# Patient Record
Sex: MALE | Race: WHITE | NOT HISPANIC OR LATINO | Employment: OTHER | URBAN - METROPOLITAN AREA
[De-identification: names, ages, dates, MRNs, and addresses within clinical notes are randomized per-mention and may not be internally consistent; named-entity substitution may affect disease eponyms.]

---

## 2019-12-13 ENCOUNTER — TRANSCRIBE ORDERS (OUTPATIENT)
Dept: POST ACUTE CARE | Age: 79
End: 2019-12-13

## 2019-12-17 ENCOUNTER — NURSING HOME VISIT (OUTPATIENT)
Dept: POST ACUTE CARE | Age: 79
End: 2019-12-17

## 2019-12-17 DIAGNOSIS — K21.9 GASTROESOPHAGEAL REFLUX DISEASE WITHOUT ESOPHAGITIS: ICD-10-CM

## 2019-12-17 DIAGNOSIS — E55.9 VITAMIN D DEFICIENCY: ICD-10-CM

## 2019-12-17 DIAGNOSIS — R33.9 URINARY RETENTION: ICD-10-CM

## 2019-12-17 DIAGNOSIS — Z96.642 STATUS POST TOTAL REPLACEMENT OF LEFT HIP: Primary | ICD-10-CM

## 2019-12-17 DIAGNOSIS — R53.81 DEBILITY: ICD-10-CM

## 2019-12-17 DIAGNOSIS — Z79.01 ANTICOAGULATED: ICD-10-CM

## 2019-12-17 DIAGNOSIS — M15.9 PRIMARY OSTEOARTHRITIS INVOLVING MULTIPLE JOINTS: ICD-10-CM

## 2019-12-17 PROCEDURE — 99310 SBSQ NF CARE HIGH MDM 45: CPT | Performed by: CLINICAL NURSE SPECIALIST

## 2019-12-17 PROCEDURE — 1125F AMNT PAIN NOTED PAIN PRSNT: CPT | Performed by: CLINICAL NURSE SPECIALIST

## 2019-12-17 PROCEDURE — 1160F RVW MEDS BY RX/DR IN RCRD: CPT | Performed by: CLINICAL NURSE SPECIALIST

## 2019-12-17 PROCEDURE — 1157F ADVNC CARE PLAN IN RCRD: CPT | Performed by: CLINICAL NURSE SPECIALIST

## 2019-12-17 PROCEDURE — 1170F FXNL STATUS ASSESSED: CPT | Performed by: CLINICAL NURSE SPECIALIST

## 2019-12-17 RX ORDER — DOCUSATE SODIUM 100 MG/1
100 CAPSULE, LIQUID FILLED ORAL 2 TIMES DAILY PRN
COMMUNITY

## 2019-12-17 RX ORDER — BISACODYL 10 MG
10 SUPPOSITORY, RECTAL RECTAL DAILY PRN
COMMUNITY

## 2019-12-17 RX ORDER — SALIVA STIMULANT COMB. NO.3
1 SPRAY, NON-AEROSOL (ML) MUCOUS MEMBRANE
COMMUNITY

## 2019-12-17 RX ORDER — TAMSULOSIN HYDROCHLORIDE 0.4 MG/1
0.4 CAPSULE ORAL
COMMUNITY

## 2019-12-17 RX ORDER — ERGOCALCIFEROL 1.25 MG/1
1 CAPSULE ORAL
COMMUNITY

## 2019-12-17 RX ORDER — HYDROCODONE BITARTRATE AND ACETAMINOPHEN 10; 325 MG/1; MG/1
1 TABLET ORAL EVERY 6 HOURS PRN
COMMUNITY
Start: 2019-12-31

## 2019-12-17 RX ORDER — OMEPRAZOLE 20 MG/1
40 CAPSULE, DELAYED RELEASE ORAL DAILY
COMMUNITY

## 2019-12-17 ASSESSMENT — ENCOUNTER SYMPTOMS
WEAKNESS: 1
ACTIVITY CHANGE: 1

## 2019-12-20 ENCOUNTER — NURSING HOME VISIT (OUTPATIENT)
Dept: POST ACUTE CARE | Age: 79
End: 2019-12-20

## 2019-12-20 DIAGNOSIS — M13.89 ALLERGIC ARTHRITIS, MULTIPLE SITES: ICD-10-CM

## 2019-12-20 DIAGNOSIS — Z96.642 STATUS POST TOTAL REPLACEMENT OF LEFT HIP: ICD-10-CM

## 2019-12-20 DIAGNOSIS — R53.81 DEBILITY: Primary | ICD-10-CM

## 2019-12-20 PROCEDURE — 99309 SBSQ NF CARE MODERATE MDM 30: CPT | Performed by: NURSE PRACTITIONER

## 2019-12-20 PROCEDURE — 1160F RVW MEDS BY RX/DR IN RCRD: CPT | Performed by: NURSE PRACTITIONER

## 2019-12-20 PROCEDURE — 1126F AMNT PAIN NOTED NONE PRSNT: CPT | Performed by: NURSE PRACTITIONER

## 2019-12-20 PROCEDURE — 1170F FXNL STATUS ASSESSED: CPT | Performed by: NURSE PRACTITIONER

## 2019-12-23 PROBLEM — M13.89 ALLERGIC ARTHRITIS, MULTIPLE SITES: Status: ACTIVE | Noted: 2019-12-23

## 2019-12-23 ASSESSMENT — ENCOUNTER SYMPTOMS
WEAKNESS: 1
ACTIVITY CHANGE: 1

## 2019-12-24 ENCOUNTER — NURSING HOME VISIT (OUTPATIENT)
Dept: POST ACUTE CARE | Age: 79
End: 2019-12-24

## 2019-12-24 DIAGNOSIS — K21.9 GASTROESOPHAGEAL REFLUX DISEASE WITHOUT ESOPHAGITIS: ICD-10-CM

## 2019-12-24 DIAGNOSIS — Z96.642 STATUS POST TOTAL REPLACEMENT OF LEFT HIP: Primary | ICD-10-CM

## 2019-12-24 DIAGNOSIS — M15.9 PRIMARY OSTEOARTHRITIS INVOLVING MULTIPLE JOINTS: ICD-10-CM

## 2019-12-24 DIAGNOSIS — R53.81 DEBILITY: ICD-10-CM

## 2019-12-24 DIAGNOSIS — M13.89 ALLERGIC ARTHRITIS, MULTIPLE SITES: ICD-10-CM

## 2019-12-24 PROCEDURE — 1170F FXNL STATUS ASSESSED: CPT | Performed by: NURSE PRACTITIONER

## 2019-12-24 PROCEDURE — 1160F RVW MEDS BY RX/DR IN RCRD: CPT | Performed by: NURSE PRACTITIONER

## 2019-12-24 PROCEDURE — 1126F AMNT PAIN NOTED NONE PRSNT: CPT | Performed by: NURSE PRACTITIONER

## 2019-12-24 PROCEDURE — 99316 NF DSCHRG MGMT 30 MIN+: CPT | Performed by: NURSE PRACTITIONER

## 2019-12-26 ENCOUNTER — NURSING HOME VISIT (OUTPATIENT)
Dept: POST ACUTE CARE | Age: 79
End: 2019-12-26

## 2019-12-26 DIAGNOSIS — M15.9 PRIMARY OSTEOARTHRITIS INVOLVING MULTIPLE JOINTS: ICD-10-CM
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
## 2019-12-26 DIAGNOSIS — Z96.642 STATUS POST TOTAL REPLACEMENT OF LEFT HIP: ICD-10-CM

## 2019-12-26 PROCEDURE — 1126F AMNT PAIN NOTED NONE PRSNT: CPT | Performed by: NURSE PRACTITIONER

## 2019-12-26 PROCEDURE — 99309 SBSQ NF CARE MODERATE MDM 30: CPT | Performed by: NURSE PRACTITIONER

## 2019-12-26 PROCEDURE — 1170F FXNL STATUS ASSESSED: CPT | Performed by: NURSE PRACTITIONER

## 2021-08-23 ENCOUNTER — TELEPHONE (OUTPATIENT)
Dept: URGENT CARE | Age: 81
End: 2021-08-23

## 2021-11-25 ENCOUNTER — HOSPITAL ENCOUNTER (EMERGENCY)
Facility: HOSPITAL | Age: 81
Discharge: HOME | End: 2021-11-25
Attending: EMERGENCY MEDICINE
Payer: MEDICARE

## 2021-11-25 ENCOUNTER — APPOINTMENT (OUTPATIENT)
Dept: CT IMAGING | Facility: HOSPITAL | Age: 81
End: 2021-11-25
Attending: EMERGENCY MEDICINE
Payer: MEDICARE

## 2021-11-25 VITALS
BODY MASS INDEX: 28.63 KG/M2 | WEIGHT: 200 LBS | OXYGEN SATURATION: 97 % | HEART RATE: 77 BPM | RESPIRATION RATE: 18 BRPM | HEIGHT: 70 IN | DIASTOLIC BLOOD PRESSURE: 82 MMHG | SYSTOLIC BLOOD PRESSURE: 148 MMHG | TEMPERATURE: 98 F

## 2021-11-25 DIAGNOSIS — S01.01XA LACERATION OF SCALP, INITIAL ENCOUNTER: ICD-10-CM
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
## 2021-11-25 PROCEDURE — 72125 CT NECK SPINE W/O DYE: CPT | Performed by: EMERGENCY MEDICINE

## 2021-11-25 PROCEDURE — 99284 EMERGENCY DEPT VISIT MOD MDM: CPT

## 2021-11-25 PROCEDURE — 70450 CT HEAD/BRAIN W/O DYE: CPT | Performed by: EMERGENCY MEDICINE

## 2021-11-26 NOTE — ED PROVIDER NOTES
Patient Seen in: [facility] Emergency Department      History   Patient presents with:  Fall    Stated Complaint:     Subjective:   HPI    Patient was walking down the stairs, missed the last step, and fell, hit the back of his head on the corner of Never Used    Vaping Use      Vaping Use: Never used    Alcohol use: No    Drug use: No             Review of Systems    Positive for stated complaint:   Other systems are as noted in HPI. Constitutional and vital signs reviewed.       All other systems re pain in the back of the head and neck on report to nursing. Both the studies are reassuring. What is questionable is why patient is on the blood thinner. Looking at notes from [facility], patient seems to be on aspirin, Plavix, and Xarelto.   He ha

## 2021-11-26 NOTE — ED INITIAL ASSESSMENT (HPI)
PT fell down last stair, hit head on corner of wall. PT denies LOC. PT reports being off blood thinners for one week.

## 2021-12-09 ENCOUNTER — HOSPITAL ENCOUNTER (OUTPATIENT)
Age: 81
Discharge: HOME | End: 2021-12-09
Payer: MEDICARE

## 2021-12-09 VITALS
HEART RATE: 104 BPM | SYSTOLIC BLOOD PRESSURE: 135 MMHG | RESPIRATION RATE: 21 BRPM | TEMPERATURE: 97 F | DIASTOLIC BLOOD PRESSURE: 83 MMHG | OXYGEN SATURATION: 96 %

## 2021-12-09 DIAGNOSIS — T78.40XA ALLERGIC REACTION, INITIAL ENCOUNTER: Primary | ICD-10-CM

## 2021-12-09 PROCEDURE — 99213 OFFICE O/P EST LOW 20 MIN: CPT | Performed by: NURSE PRACTITIONER

## 2021-12-09 RX ORDER — PREDNISONE 20 MG/1
20 TABLET ORAL ONCE
Status: COMPLETED | OUTPATIENT
Start: 2021-12-09 | End: 2021-12-09

## 2021-12-09 RX ORDER — FAMOTIDINE 20 MG/1
20 TABLET ORAL ONCE
Status: COMPLETED | OUTPATIENT
Start: 2021-12-09 | End: 2021-12-09

## 2021-12-09 NOTE — ED INITIAL ASSESSMENT (HPI)
Pt noticed a rash on the back of his neck since Tuesday evening. Pt's only symptom is itching (no burning, tingling, numbness, or discharge). Rash is located on the upper neck and between the shoulders. Pt is not in pain.    Pt has not taken or applied an

## 2021-12-16 NOTE — ED PROVIDER NOTES
Patient Seen in: [facility]    History   Patient presents with:  Rash Skin Problem: Back of neck    Stated Complaint: rash on neck    HPI    Pt complains of an allergic reaction that began on Tuesday after sitting in the movie theater watchin Use      Vaping Use: Never used    Alcohol use: No    Drug use: No      Review of Systems    Positive for stated complaint: rash on neck  Other systems are as noted in HPI. Constitutional and vital signs reviewed.       All other systems reviewed and negat for prednisone and pepcid nd instructions given on use.   Return to ED precautions discussed with the patient      Disposition and Plan     Clinical Impression:  Allergic reaction, initial encounter  (primary encounter diagnosis)    Disposition:  Discharge

## 2023-01-13 ENCOUNTER — HOSPITAL ENCOUNTER (OUTPATIENT)
Age: 83
Discharge: HOME | End: 2023-01-13
Payer: MEDICARE

## 2023-01-13 VITALS
HEART RATE: 98 BPM | RESPIRATION RATE: 18 BRPM | DIASTOLIC BLOOD PRESSURE: 81 MMHG | SYSTOLIC BLOOD PRESSURE: 138 MMHG | OXYGEN SATURATION: 99 % | TEMPERATURE: 98 F

## 2023-01-13 DIAGNOSIS — R30.0 DYSURIA: ICD-10-CM

## 2023-01-13 DIAGNOSIS — R31.9 URINARY TRACT INFECTION WITH HEMATURIA, SITE UNSPECIFIED: Primary | ICD-10-CM

## 2023-01-13 DIAGNOSIS — N39.0 URINARY TRACT INFECTION WITH HEMATURIA, SITE UNSPECIFIED: Primary | ICD-10-CM

## 2023-01-13 LAB
GLUCOSE UR STRIP-MCNC: NEGATIVE MG/DL
NITRITE UR QL STRIP: POSITIVE
PH UR STRIP: 5.5 [PH]
PROT UR STRIP-MCNC: >=300 MG/DL
SP GR UR STRIP: 1.02
UROBILINOGEN UR STRIP-ACNC: <2 MG/DL

## 2023-01-13 PROCEDURE — 81002 URINALYSIS NONAUTO W/O SCOPE: CPT | Performed by: PHYSICIAN ASSISTANT

## 2023-01-13 PROCEDURE — 99213 OFFICE O/P EST LOW 20 MIN: CPT | Performed by: PHYSICIAN ASSISTANT

## 2023-01-13 RX ORDER — CEPHALEXIN 500 MG/1
500 CAPSULE ORAL 3 TIMES DAILY
Qty: 21 CAPSULE | Refills: 0 | Status: SHIPPED | OUTPATIENT
Start: 2023-01-13 | End: 2023-01-20

## 2023-01-13 RX ORDER — METOPROLOL SUCCINATE 25 MG/1
25 TABLET, EXTENDED RELEASE ORAL DAILY
COMMUNITY
Start: 2023-01-01

## 2023-01-13 RX ORDER — OMEPRAZOLE 40 MG/1
CAPSULE, DELAYED RELEASE ORAL
COMMUNITY
Start: 2023-01-01

## 2023-01-15 RX ORDER — SULFAMETHOXAZOLE AND TRIMETHOPRIM 800; 160 MG/1; MG/1
1 TABLET ORAL 2 TIMES DAILY
Qty: 14 TABLET | Refills: 0 | Status: SHIPPED | OUTPATIENT
Start: 2023-01-15 | End: 2023-01-22

## 2023-02-09 ENCOUNTER — HOSPITAL ENCOUNTER (OUTPATIENT)
Age: 83
Discharge: HOME | End: 2023-02-09
Payer: MEDICARE

## 2023-02-09 VITALS
TEMPERATURE: 97 F | HEART RATE: 78 BPM | SYSTOLIC BLOOD PRESSURE: 132 MMHG | DIASTOLIC BLOOD PRESSURE: 81 MMHG | OXYGEN SATURATION: 95 % | RESPIRATION RATE: 18 BRPM

## 2023-02-09 DIAGNOSIS — N30.01 ACUTE CYSTITIS WITH HEMATURIA: Primary | ICD-10-CM

## 2023-02-09 LAB
BILIRUB UR QL STRIP: NEGATIVE
CLARITY UR: CLEAR
COLOR UR: YELLOW
GLUCOSE UR STRIP-MCNC: NEGATIVE MG/DL
KETONES UR STRIP-MCNC: NEGATIVE MG/DL
NITRITE UR QL STRIP: NEGATIVE
PH UR STRIP: 6.5 [PH]
PROT UR STRIP-MCNC: NEGATIVE MG/DL
SP GR UR STRIP: 1.01
UROBILINOGEN UR STRIP-ACNC: <2 MG/DL

## 2023-02-09 PROCEDURE — 99213 OFFICE O/P EST LOW 20 MIN: CPT | Performed by: NURSE PRACTITIONER

## 2023-02-09 PROCEDURE — 81002 URINALYSIS NONAUTO W/O SCOPE: CPT | Performed by: NURSE PRACTITIONER

## 2023-02-09 RX ORDER — SULFAMETHOXAZOLE AND TRIMETHOPRIM 800; 160 MG/1; MG/1
1 TABLET ORAL 2 TIMES DAILY
Qty: 14 TABLET | Refills: 0 | Status: SHIPPED | OUTPATIENT
Start: 2023-02-09 | End: 2023-02-16

## 2023-02-09 NOTE — DISCHARGE INSTRUCTIONS
Follow up with your doctor this week to check your kidney function and possible need for urology follow up if symptoms continue. Take bactrim two times a day for 7 days  Finish full amount. A culture was sent so if this comes back positive for a bacteria not covered by antibiotic given, you will get a call from us to change medication. Increase water intake, urinate often. RETURN OR GO TO ED For worsening back pain, fevers, worsening abdominal pain, nausea and vomiting.

## 2023-02-09 NOTE — ED INITIAL ASSESSMENT (HPI)
Pt was seen a few weeks ago, on antibiotics, finished full course. Pt states \"it seems like it was clearing up, but last Wednesday it started acting up again\". Pt states dysuria at times, and urgency. Denies fevers, denies abdominal pain.

## 2024-06-22 ENCOUNTER — HOSPITAL ENCOUNTER (OUTPATIENT)
Age: 84
Discharge: HOME | End: 2024-06-22

## 2024-06-22 VITALS
HEART RATE: 77 BPM | OXYGEN SATURATION: 94 % | DIASTOLIC BLOOD PRESSURE: 66 MMHG | RESPIRATION RATE: 18 BRPM | SYSTOLIC BLOOD PRESSURE: 119 MMHG | TEMPERATURE: 98 F

## 2024-06-22 DIAGNOSIS — R23.8 SKIN BREAKDOWN: Primary | ICD-10-CM

## 2024-06-22 NOTE — ED PROVIDER NOTES
Patient Seen in: [facility]      History     Chief Complaint   Patient presents with    Rash Skin Problem     Stated Complaint: rash    Subjective:   HPI    This is a well-appearing 84-year-old male with a history of DVT, GERD, PE who presents with a chief complaint of rash to the right buttock for the last 3 weeks.  Patient states that he started wearing his ena jeans shorts again and shortly thereafter started having some irritation.  Has been using an antibiotic ointment over-the-counter with minimal relief.  No fever.    Objective:   Past Medical History:    Cancer (HCC)    DVT (deep venous thrombosis) (HCC)    Esophageal reflux    OTHER DISEASES    acid reflux    OTHER DISEASES    hx of blood clots x 2 in lung and 1 in leg    Pulmonary embolism (HCC)    Reflux              Past Surgical History:   Procedure Laterality Date    Hip replacement surgery Bilateral     Patient documented not to have experienced any of the following events  1/8/2014    Procedure: RIGHT PHACOEMULSIFICATION OF CATARACT WITH INTRAOCULAR LENS IMPLANT [id number];  Surgeon: Prabhjot Sam MD;  Location: [facility]    Patient withough preoperative order for iv antibiotic surgical site infection prophylaxis.  1/8/2014    Procedure: RIGHT PHACOEMULSIFICATION OF CATARACT WITH INTRAOCULAR LENS IMPLANT [id number];  Surgeon: Prabhjot Sam MD;  Location: [facility]    Remv cataract extracap,insert lens  1/8/2014    Procedure: RIGHT PHACOEMULSIFICATION OF CATARACT WITH INTRAOCULAR LENS IMPLANT [id number];  Surgeon: Prabhjot Sam MD;  Location: [facility]                Social History     Socioeconomic History    Marital status:    Tobacco Use    Smoking status: Former    Smokeless tobacco: Never   Vaping Use    Vaping status: Never Used   Substance and Sexual Activity    Alcohol use: No    Drug use: No              Review of Systems   Skin:  Positive for rash.   All other systems reviewed and  are negative.      Positive for stated complaint: rash  Other systems are as noted in HPI.  Constitutional and vital signs reviewed.      All other systems reviewed and negative except as noted above.    Physical Exam     ED Triage Vitals [06/22/24 1226]   /66   Pulse 77   Resp 18   Temp 98.2 °F (36.8 °C)   Temp src Temporal   SpO2 94 %   O2 Device None (Room air)       Current Vitals:   Vital Signs  BP: 119/66  Pulse: 77  Resp: 18  Temp: 98.2 °F (36.8 °C)  Temp src: Temporal    Oxygen Therapy  SpO2: 94 %  O2 Device: None (Room air)            Physical Exam  Vitals and nursing note reviewed.   Constitutional:       General: He is awake. He is not in acute distress.     Appearance: Normal appearance. He is not ill-appearing, toxic-appearing or diaphoretic.   HENT:      Head: Normocephalic and atraumatic.      Right Ear: Tympanic membrane, ear canal and external ear normal.      Left Ear: Tympanic membrane, ear canal and external ear normal.      Nose: Nose normal.      Mouth/Throat:      Mouth: Mucous membranes are moist.      Pharynx: Oropharynx is clear. Uvula midline.   Eyes:      General: Lids are normal.      Extraocular Movements: Extraocular movements intact.      Conjunctiva/sclera: Conjunctivae normal.      Pupils: Pupils are equal, round, and reactive to light.   Cardiovascular:      Rate and Rhythm: Normal rate and regular rhythm.      Pulses: Normal pulses.      Heart sounds: Normal heart sounds.   Pulmonary:      Effort: Pulmonary effort is normal.      Breath sounds: Normal breath sounds.   Abdominal:      General: Bowel sounds are normal.      Tenderness: There is no abdominal tenderness.   Genitourinary:         Comments: Area of skin breakdown, erythema. No induration or fluctuance   Skin:     General: Skin is warm and dry.      Capillary Refill: Capillary refill takes less than 2 seconds.   Neurological:      General: No focal deficit present.      Mental Status: He is alert and oriented to  person, place, and time.   Psychiatric:         Mood and Affect: Mood normal.         Behavior: Behavior normal. Behavior is cooperative.         Thought Content: Thought content normal.         Judgment: Judgment normal.       ED Course   Labs Reviewed - No data to display      MDM       Medical Decision Making  Differential diagnoses reflecting the complexity of care include but are not limited to abscess, cellulitis, skin breakdown.    Comorbidities that add complexity to management include: N/A  History obtained by an independent source was from: N/A  Discussions of management was done with: N/A  My independent interpretations of studies include: N/A  Shared decision making was done by: N/A  Patient is well appearing, non-toxic and in no acute distress.  Vital signs are stable.   Based on examination distant with skin breakdown, possibly from his jeans short material rubbing on the area.  Will prescribe Bactroban.  Patient does have a follow-up scheduled with his primary care provider for next week.  Encouraged him to follow-up.  Strict ER precautions given.    Problems Addressed:  Skin breakdown: acute illness or injury    Risk  Prescription drug management.        Disposition and Plan     Clinical Impression:  1. Skin breakdown         Disposition:  Discharge  6/22/2024 12:33 pm    Follow-up:  Alfredo Michele  [id number]  [facility] [address] [phone]                Medications Prescribed:  Discharge Medication List as of 6/22/2024 12:33 PM        START taking these medications    Details   mupirocin 2 % External Ointment Apply 1 Application topically 3 (three) times daily for 14 days., Normal, Disp-1 each, R-0

## 2024-09-26 ENCOUNTER — HOSPITAL ENCOUNTER (OUTPATIENT)
Age: 84
Discharge: HOME | End: 2024-09-26
Payer: MEDICARE

## 2024-09-26 VITALS
HEART RATE: 78 BPM | OXYGEN SATURATION: 95 % | DIASTOLIC BLOOD PRESSURE: 62 MMHG | SYSTOLIC BLOOD PRESSURE: 121 MMHG | RESPIRATION RATE: 19 BRPM | TEMPERATURE: 98 F

## 2024-09-26 DIAGNOSIS — R30.0 DYSURIA: Primary | ICD-10-CM

## 2024-09-26 LAB
CLARITY UR: CLEAR
COLOR UR: YELLOW
GLUCOSE UR STRIP-MCNC: NEGATIVE MG/DL
NITRITE UR QL STRIP: NEGATIVE
PH UR STRIP: 5.5 [PH]
PROT UR STRIP-MCNC: 30 MG/DL
SP GR UR STRIP: >=1.03
UROBILINOGEN UR STRIP-ACNC: <2 MG/DL

## 2024-09-26 PROCEDURE — 81002 URINALYSIS NONAUTO W/O SCOPE: CPT | Performed by: NURSE PRACTITIONER

## 2024-09-26 PROCEDURE — 99213 OFFICE O/P EST LOW 20 MIN: CPT | Performed by: NURSE PRACTITIONER

## 2024-09-26 RX ORDER — MIDODRINE HYDROCHLORIDE 5 MG/1
5 TABLET ORAL
COMMUNITY
Start: 2024-02-20

## 2024-09-26 RX ORDER — CEFPODOXIME PROXETIL 200 MG/1
200 TABLET, FILM COATED ORAL 2 TIMES DAILY
Qty: 14 TABLET | Refills: 0 | Status: SHIPPED | OUTPATIENT
Start: 2024-09-26 | End: 2024-09-26 | Stop reason: ALTCHOICE

## 2024-09-26 RX ORDER — SULFAMETHOXAZOLE/TRIMETHOPRIM 800-160 MG
1 TABLET ORAL 2 TIMES DAILY
Qty: 14 TABLET | Refills: 0 | Status: SHIPPED | OUTPATIENT
Start: 2024-09-26 | End: 2024-10-03

## 2024-09-26 RX ORDER — ATORVASTATIN CALCIUM 20 MG/1
1 TABLET, FILM COATED ORAL DAILY
COMMUNITY
Start: 2023-07-17

## 2024-09-26 NOTE — ED PROVIDER NOTES
Patient Seen in: [facility]    History   CC: \"urine infection\"  HPI: Arsh Ann 84 year old male  who presents c/o burning with urination, urinary frequency, urgency and hesitancy beginning yesterday. Denies abd pain, n/v/d/c, fever, rash, flank pain or hematuria. Hx of urine infection 4-5 years ago per pt and this feels similar. Non-circumcised male.    Past Medical History:    Cancer (HCC)    DVT (deep venous thrombosis) (HCC)    Esophageal reflux    OTHER DISEASES    acid reflux    OTHER DISEASES    hx of blood clots x 2 in lung and 1 in leg    Pulmonary embolism (HCC)    Reflux       Past Surgical History:   Procedure Laterality Date    Hip replacement surgery Bilateral     Patient documented not to have experienced any of the following events  1/8/2014    Procedure: RIGHT PHACOEMULSIFICATION OF CATARACT WITH INTRAOCULAR LENS IMPLANT [id number];  Surgeon: Prabhjot Sam MD;  Location: [facility]    Patient withough preoperative order for iv antibiotic surgical site infection prophylaxis.  1/8/2014    Procedure: RIGHT PHACOEMULSIFICATION OF CATARACT WITH INTRAOCULAR LENS IMPLANT [id number];  Surgeon: Prabhjot Sam MD;  Location: [facility]    Remv cataract extracap,insert lens  1/8/2014    Procedure: RIGHT PHACOEMULSIFICATION OF CATARACT WITH INTRAOCULAR LENS IMPLANT [id number];  Surgeon: Prabhjot Sam MD;  Location: [facility]       History reviewed. No pertinent family history.    Social History     Socioeconomic History    Marital status:    Tobacco Use    Smoking status: Former    Smokeless tobacco: Never   Vaping Use    Vaping status: Never Used   Substance and Sexual Activity    Alcohol use: No    Drug use: No       ROS:  Review of Systems    Positive for stated complaint: Urinary Symptoms  Other systems are as noted in HPI.  Constitutional and vital signs reviewed.      All other systems reviewed and negative except as noted above.    PSFH elements  reviewed from today and agreed except as otherwise stated in HPI.             Constitutional and vital signs reviewed.        Physical Exam     ED Triage Vitals [09/26/24 1349]   /62   Pulse 78   Resp 19   Temp 98.2 °F (36.8 °C)   Temp src Temporal   SpO2 95 %   O2 Device None (Room air)       Current:/62   Pulse 78   Temp 98.2 °F (36.8 °C) (Temporal)   Resp 19   SpO2 95%         PE:  General - Appears well, non-toxic and in NAD  Head - Appears symmetrical without deformity/swelling cranium, scalp, or facial bones  GI - Appears round and flat, no tenderness/guarding with palpation   - no CVA tenderness.   Skin - no rashes or petechiae noted, pink warm and dry throughout, mmm, cap refill <2seconds  Neuro - A&O x4, steady gait  MSK - makes purposeful movements of all extremities  Psych - Interactive and appropriate      ED Course     Labs Reviewed   EM POCT URINALYSIS DIPSTICK - Abnormal; Notable for the following components:       Result Value    Protein urine 30 (*)     Ketone, Urine Trace (*)     Bilirubin, Urine Small (*)     Blood, Urine Trace-Intact (*)     Leukocyte esterase urine Trace (*)     All other components within normal limits   URINE CULTURE, ROUTINE       MDM     DDx: Cystitis, pyelonephritis, prostatitis, balanitis    Urine dip with protein, ketones, trace blood and trace leukocyte esterase.  Dedicated urine culture pending.  We will empirically start treatment with Bactrim and advise follow-up with PCP or urologist as provided as well as strict ED/return precautions reviewed, rest, hydration instructions. Patient is historian and demonstrates understanding of all instruction and agrees with plan of care.      Disposition and Plan     Clinical Impression:  1. Dysuria        Disposition:  Discharge    Follow-up:  Reji Jones  [address] [place] [address] [phone]    Go in 1 week  As needed    Carmina Marshall MD  [address] [facility] [address]  [place]  [address]  [phone]    Go in 1 week  As needed      Medications Prescribed:  Current Discharge Medication List        START taking these medications    Details   sulfamethoxazole-trimethoprim -160 MG Oral Tab per tablet Take 1 tablet by mouth 2 (two) times daily for 7 days.  Qty: 14 tablet, Refills: 0    Comments: PLS CANCEL CEFPODOXIME, and fill Bactrim. Thank you. [phone] if needed.

## 2024-09-26 NOTE — DISCHARGE INSTRUCTIONS
Make sure to stay well hydrated with clear fluids. Take the full course of antibiotics as prescribed, even if you begin to feel better. Follow up with your primary care provider within the next 2 days. Seek additional care in the ER for new or worsening symptoms, fever, abdominal pain, back pain, blood in your urine, or persistent vomiting/diarrhea.

## 2025-02-06 ENCOUNTER — APPOINTMENT (OUTPATIENT)
Dept: GENERAL RADIOLOGY | Age: 85
End: 2025-02-06
Attending: PHYSICIAN ASSISTANT
Payer: MEDICARE

## 2025-02-06 ENCOUNTER — HOSPITAL ENCOUNTER (OUTPATIENT)
Age: 85
Discharge: HOME | End: 2025-02-06
Payer: MEDICARE

## 2025-02-06 VITALS
OXYGEN SATURATION: 96 % | DIASTOLIC BLOOD PRESSURE: 62 MMHG | RESPIRATION RATE: 18 BRPM | SYSTOLIC BLOOD PRESSURE: 124 MMHG | HEART RATE: 88 BPM | TEMPERATURE: 97 F

## 2025-02-06 DIAGNOSIS — S52.614A CLOSED NONDISPLACED FRACTURE OF STYLOID PROCESS OF RIGHT ULNA, INITIAL ENCOUNTER: ICD-10-CM

## 2025-02-06 DIAGNOSIS — S09.90XA INJURY OF HEAD, INITIAL ENCOUNTER: ICD-10-CM

## 2025-02-06 DIAGNOSIS — S60.211A CONTUSION OF RIGHT WRIST, INITIAL ENCOUNTER: Primary | ICD-10-CM

## 2025-02-06 PROCEDURE — 99213 OFFICE O/P EST LOW 20 MIN: CPT | Performed by: PHYSICIAN ASSISTANT

## 2025-02-06 PROCEDURE — 29125 APPL SHORT ARM SPLINT STATIC: CPT | Performed by: PHYSICIAN ASSISTANT

## 2025-02-06 PROCEDURE — 73110 X-RAY EXAM OF WRIST: CPT | Performed by: PHYSICIAN ASSISTANT

## 2025-02-06 NOTE — DISCHARGE INSTRUCTIONS
FOLLOW UP WITH ORTHOPEDIC FOR WRIST INJURY. FOLLOW CAST INSTRUCTION.     READDRESS EMERGENTLY FOR CONCERNS BY INSTRUCTION FOR HEAD IMAGING.

## 2025-02-06 NOTE — ED INITIAL ASSESSMENT (HPI)
Pt with R wrist pain after a slip and fall that occured ~0800 this morning. Pt reports that he did hit the back of his head, denies pain to area. Pr refusing a CT of his head. Pt denied LOC. Pt on Xarelto. Pt gave verbal consent for us to speak to his son Bijan

## 2025-02-06 NOTE — ED PROVIDER NOTES
Chief Complaint   Patient presents with    Fall       HPI:     Arsh Ann is a 84 year old male who presents for evaluation of mechanical fall 6 hours ago slipping on ice falling backwards onto back and outstretched right hand, notes pain along the right wrist with localized bruising, maximum pain 3 out of 10, notes medications prior to arrival, patient notes associated scalp injury upon fall without LOC or prolonged time down, denies associated headache blurred vision photophobia nausea vomiting neck pain chest pain shortness of breath abdominal pain back pain upper lower extremity weakness or numbness.  Patient is alert answering all questions following all commands on arrival.  Patient is on Xarelto baseline for PE DVT.  Patient is declining CT considerations of brain after initial assessment understands risks including morbidity mortality.      PFSH    Critical access hospital asessment screens reviewed and agree.  Nurses notes reviewed I agree with documentation.    No family history on file.  Family history reviewed with patient/caregiver and is not pertinent to presenting problem.  Social History     Socioeconomic History    Marital status:      Spouse name: Not on file    Number of children: Not on file    Years of education: Not on file    Highest education level: Not on file   Occupational History    Not on file   Tobacco Use    Smoking status: Former    Smokeless tobacco: Never   Vaping Use    Vaping status: Never Used   Substance and Sexual Activity    Alcohol use: No    Drug use: No    Sexual activity: Not on file   Other Topics Concern    Not on file   Social History Narrative    Not on file     Social Drivers of Health     Food Insecurity: Low Risk  (1/8/2025)    Received from [facility] Food Security     Within the past 12 months, the food you bought just didn't last and you didn't have money to get more.: 3     Within the past 12 months, you worried that your food would run out before you got money  to buy more.: 3   Transportation Needs: Not At Risk (1/8/2025)    Received from [facility] Transportation Needs     In the past 12 months, has lack of reliable transportation kept you from medical appointments, meetings, work or from getting things needed for daily living?: No   Housing Stability: Not At Risk (1/8/2025)    Received from [facility] Housing     What is your living situation today?: I have a steady place to live     Think about the place you live. Do you have problems with any of the following?: None of the above         ROS:   Positive for stated complaint: Wrist injury head injury.  All other systems reviewed and negative except as noted above.  Constitutional and Vital Signs Reviewed.      Physical Exam:     Findings:    /62   Pulse 88   Temp 97.2 °F (36.2 °C) (Oral)   Resp 18   SpO2 96%   GENERAL: well developed, well nourished, well hydrated, no distress  SKIN: good skin turgor, no obvious rashes  NECK: No midline cervical tenderness.  Supple, no adenopathy  EXTREMITIES: Mild ecchymosis and localized tenderness along the volar right distal forearm.  No intercostal tenderness or ecchymosis or thoracic or lumbar tenderness.  No pelvic instability or hip tenderness, normal straight leg raise bilaterally.  No hand elbow or shoulder tenderness bilaterally.  Radial pulse cap refill and distal sensation intactMAE without difficulty  GI: soft, non-tender, normal bowel sounds  HEAD: normocephalic, atraumatic; no ecchymosis or tenderness along scalp or face.  EYES: sclera non icteric bilateral, conjunctiva clear  EARS: No hemotympanum.  TMs clear bilaterally. Canals clear.  NOSE: nasal turbinates: pink, normal mucosa  LUNGS: clear to auscultation bilaterally; no rales, rhonchi, or wheezes  NEURO: No focal deficits  PSYCH: Alert and oriented x3.  Answering questions appropriately.  Mood appropriate.    MDM/Assessment/Plan:   Orders for this encounter:    Orders Placed This  Encounter    XR WRIST COMPLETE (MIN 3 VIEWS), RIGHT (CPT=73110)     Order Specific Question:   What is the Relevant Clinical Indication / Reason for Exam?     Answer:   PAIN, FALL     Order Specific Question:   Release to patient     Answer:   Immediate    Short arm splint    Sling       Labs performed this visit:  No results found for this or any previous visit (from the past 10 hours).    MDM:  Patient x-ray shows a distal radial fracture and nondisplaced.  Short arm splint was applied, neurovascular intact with sling.  Discussed further recommendations for CT based on mechanism as well as anticoagulation therapy, patient declining lucid and decisional understands further based on concerns and potential risk including morbidity mortality, patient verbally consented to me reach out to his son Miky by demographic agreed to follow-up and monitor patient's today and tomorrow and address emergent concerns.      Dr. Leonard notified.    Diagnosis:    ICD-10-CM    1. Contusion of right wrist, initial encounter  S60.211A XR WRIST COMPLETE (MIN 3 VIEWS), RIGHT (CPT=73110)     XR WRIST COMPLETE (MIN 3 VIEWS), RIGHT (CPT=73110)      2. Injury of head, initial encounter  S09.90XA       3. Closed nondisplaced fracture of styloid process of right ulna, initial encounter  S52.614A           All results reviewed and discussed with patient.  See AVS for detailed discharge instructions for your condition today.    Follow Up with:  Reji Jones  [address]  [facility] [address] [phone]    Call in 1 day  READDRESS WITH PRIMARY CARE. GO TO ER FOR BREAKTHROUGH/ACUTE CONCERNS.    Nazario Cordoba MD  [address],  [place] [address] [phone]    Schedule an appointment as soon as possible for a visit in 1 day  ORTHOPEDIC REFERRAL

## 2025-02-07 ENCOUNTER — TELEPHONE (OUTPATIENT)
Facility: CLINIC | Age: 85
End: 2025-02-07

## 2025-02-07 DIAGNOSIS — M25.531 RIGHT WRIST PAIN: Primary | ICD-10-CM

## 2025-02-07 NOTE — TELEPHONE ENCOUNTER
Future Appointments   Date Time Provider Department Center   2/12/2025 11:00 AM Maribel Gibson PA [facility] [place]

## 2025-02-07 NOTE — TELEPHONE ENCOUNTER
Talked with patient and he would like to be seen at the [place] location on Wednesday at 11am if possible, he does not want to drive a far distance with his fracture. Please advise

## 2025-02-07 NOTE — TELEPHONE ENCOUNTER
Patient calling to schedule for right wrist fracture. Please advise when patient should be seen.

## 2025-02-07 NOTE — TELEPHONE ENCOUNTER
Can you see this patient?  If so, when?  Please advise.  Thank you!      DOI  2/6/25    XR  2/6/25  mpression   CONCLUSION:  1. A minimally impacted nondisplaced distal radius fracture just proximal to the radiocarpal articulation.  2. A nondisplaced ulnar styloid tip fracture.  New line a accessory ossicle distal to the radial styloid.  3. 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

## 2025-02-12 ENCOUNTER — HOSPITAL ENCOUNTER (OUTPATIENT)
Dept: GENERAL RADIOLOGY | Age: 85
Discharge: HOME | End: 2025-02-12
Attending: ORTHOPAEDIC SURGERY
Payer: MEDICARE

## 2025-02-12 ENCOUNTER — OFFICE VISIT (OUTPATIENT)
Dept: ORTHOPEDICS CLINIC | Facility: CLINIC | Age: 85
End: 2025-02-12
Payer: MEDICARE

## 2025-02-12 DIAGNOSIS — S52.551A OTHER CLOSED EXTRA-ARTICULAR FRACTURE OF DISTAL END OF RIGHT RADIUS, INITIAL ENCOUNTER: Primary | ICD-10-CM

## 2025-02-12 DIAGNOSIS — M25.531 RIGHT WRIST PAIN: ICD-10-CM

## 2025-02-12 DIAGNOSIS — M65.342 TRIGGER RING FINGER OF LEFT HAND: ICD-10-CM

## 2025-02-12 PROBLEM — J41.0 SMOKERS' COUGH (HCC): Chronic | Status: ACTIVE | Noted: 2025-02-12

## 2025-02-12 PROCEDURE — 73110 X-RAY EXAM OF WRIST: CPT | Performed by: ORTHOPAEDIC SURGERY

## 2025-02-12 PROCEDURE — 99214 OFFICE O/P EST MOD 30 MIN: CPT | Performed by: PHYSICIAN ASSISTANT

## 2025-02-12 PROCEDURE — 1160F RVW MEDS BY RX/DR IN RCRD: CPT | Performed by: PHYSICIAN ASSISTANT

## 2025-02-12 PROCEDURE — 1159F MED LIST DOCD IN RCRD: CPT | Performed by: PHYSICIAN ASSISTANT

## 2025-02-12 PROCEDURE — 1125F AMNT PAIN NOTED PAIN PRSNT: CPT | Performed by: PHYSICIAN ASSISTANT

## 2025-02-12 PROCEDURE — 20550 NJX 1 TENDON SHEATH/LIGAMENT: CPT | Performed by: PHYSICIAN ASSISTANT

## 2025-02-12 RX ORDER — BETAMETHASONE SODIUM PHOSPHATE AND BETAMETHASONE ACETATE 3; 3 MG/ML; MG/ML
6 INJECTION, SUSPENSION INTRA-ARTICULAR; INTRALESIONAL; INTRAMUSCULAR; SOFT TISSUE ONCE
Status: COMPLETED | OUTPATIENT
Start: 2025-02-12 | End: 2025-02-12

## 2025-02-12 RX ADMIN — BETAMETHASONE SODIUM PHOSPHATE AND BETAMETHASONE ACETATE 6 MG: 3; 3 INJECTION, SUSPENSION INTRA-ARTICULAR; INTRALESIONAL; INTRAMUSCULAR; SOFT TISSUE at 11:13:00

## 2025-02-12 NOTE — H&P
Clinic Note [facility]     Assessment/Plan:  84 year old male    Right wrist nondisplaced distal radius fracture-we will treat this nonoperatively with a removable wrist brace.  He will wear it full-time only taking off hygiene purposes and very sedentary activities and work on elevating.  He can use the hand for light activities.  He will  Left ring finger trigger finger-we discussed cortisone injection today.  He elected to proceed and tolerated the procedure very well.      Injection: Written consent was obtained.  Skin was prepped with alcohol.  1 mL of 6 mg of betamethasone and 1 mL of 1% lidocaine was injected into the left ring finger a1 pulley.  Patient tolerated the procedure well.  No complications were encountered.  Band-Aid was applied.      ICD-10-CM    1. Other closed extra-articular fracture of distal end of right radius, initial encounter  S52.551A DME - EXTERNAL       2. Trigger ring finger of left hand  M65.342 tendon sheath/ligament     betamethasone sodium phosphate & acetate (Celestone) 6 (3-3) MG/ML injection 6 mg           Follow Up: 3 weeks with x-rays before    Diagnostic Studies:  X-rays right wrist 3 views do it these revealed evidence of a nondisplaced distal radius fracture    Physical Exam:    There were no vitals taken for this visit.    Constitutional: NAD. AOx3. Well-developed and Well-nourished.   Psychiatric: Normal mood/ affect/ behavior. Judgment and thought content normal.     Right upper Extremity:   Inspection: Skin Intact. No skin lesions. No gross deformity.   Palpation: Tender palpation along Tara's tubercle nontender over the rest of the hand  Tender over the left ring finger A1 pulley with clicking on exam but no robbi locking   Motion: Elbow: normal bilateral symmetric ext/flex  Wrist: normal bilateral symmetric ext/flex/sup/pro  Finger: full composite fist       CC: Wrist Injury (RT WRIST INJURY; FELL; DOI: THURSDAY )        HPI: This 84 year old male presents  with complaints of pain to his right wrist.  He tripped and fell on 2/6/2025.  He had pain and swelling went to urgent care and they told him to follow-up orthopedics.  He continues to have some pain but is overall improving.  He also describes left ring finger triggering and locking where will catch in the flexed position and he has to forcibly extend it    Past Medical History:    Cancer (HCC)    DVT (deep venous thrombosis) (HCC)    Esophageal reflux    OTHER DISEASES    acid reflux    OTHER DISEASES    hx of blood clots x 2 in lung and 1 in leg    Pulmonary embolism (HCC)    Reflux       Past Surgical History:   Procedure Laterality Date    Hip replacement surgery Bilateral     Patient documented not to have experienced any of the following events  1/8/2014    Procedure: RIGHT PHACOEMULSIFICATION OF CATARACT WITH INTRAOCULAR LENS IMPLANT [id number];  Surgeon: Prabhjot Sam MD;  Location: [facility]    Patient withough preoperative order for iv antibiotic surgical site infection prophylaxis.  1/8/2014    Procedure: RIGHT PHACOEMULSIFICATION OF CATARACT WITH INTRAOCULAR LENS IMPLANT [id number];  Surgeon: Prabhjot Sam MD;  Location: [facility]    Remv cataract extracap,insert lens  1/8/2014    Procedure: RIGHT PHACOEMULSIFICATION OF CATARACT WITH INTRAOCULAR LENS IMPLANT [id number];  Surgeon: Prabhjot Sam MD;  Location: [facility]       Current Outpatient Medications   Medication Sig Dispense Refill    atorvastatin 20 MG Oral Tab Take 1 tablet (20 mg total) by mouth daily.      midodrine 5 MG Oral Tab Take 1 tablet (5 mg total) by mouth.      rivaroxaban (XARELTO) 2.5 MG Oral Tab Take 1 tablet (2.5 mg total) by mouth 2 (two) times daily.      metoprolol succinate ER 25 MG Oral Tablet 24 Hr Take 1 tablet (25 mg total) by mouth daily.      Omeprazole Magnesium 10 MG Oral Powd Pack Take  by mouth.      apixaban 2.5 MG Oral Tab Take 1 tablet (2.5 mg total) by mouth.      Omeprazole 40  MG Oral Capsule Delayed Release       aspirin 81 MG Oral Tab EC Take 81 mg by mouth daily. (Patient not taking: Reported on 2/12/2025)         Allergies[1]    No family history on file.    Social History     Occupational History    Not on file   Tobacco Use    Smoking status: Former    Smokeless tobacco: Never   Vaping Use    Vaping status: Never Used   Substance and Sexual Activity    Alcohol use: No    Drug use: No    Sexual activity: Not on file        Review of Systems (negative unless bolded):  General: fevers, chills, fatigue  CV:  chest pain, palpitations, leg swelling  Msk: bodyaches, neck pain, neck stiffness  Skin: rashes, open wounds, nonhealing ulcers  Hem: bleeds easily, bruise easily, immunocompromised  Neuro: dizziness, light headedness, headaches  Psych: anxious, depressed, anger issues  Maribel Gibson PA-C  Hand, Wrist, & Elbow Surgery  Physician Assistant to Dr. [email or web address] [email or web address]  t: [phone]  f: [phone]         [1] No Known Allergies

## 2025-03-03 ENCOUNTER — TELEPHONE (OUTPATIENT)
Dept: ORTHOPEDICS CLINIC | Facility: CLINIC | Age: 85
End: 2025-03-03

## 2025-03-03 DIAGNOSIS — S52.551A OTHER CLOSED EXTRA-ARTICULAR FRACTURE OF DISTAL END OF RIGHT RADIUS, INITIAL ENCOUNTER: Primary | ICD-10-CM

## 2025-03-03 NOTE — TELEPHONE ENCOUNTER
Patient wondering if Maribel needs new xrays at next visit  Future Appointments   Date Time Provider Department Center   3/5/2025 10:20 AM Maribel Gibson PA [facility]

## 2025-03-05 ENCOUNTER — OFFICE VISIT (OUTPATIENT)
Dept: ORTHOPEDICS CLINIC | Facility: CLINIC | Age: 85
End: 2025-03-05
Payer: MEDICARE

## 2025-03-05 ENCOUNTER — HOSPITAL ENCOUNTER (OUTPATIENT)
Dept: GENERAL RADIOLOGY | Age: 85
Discharge: HOME | End: 2025-03-05
Attending: PHYSICIAN ASSISTANT
Payer: MEDICARE

## 2025-03-05 DIAGNOSIS — S52.551A OTHER CLOSED EXTRA-ARTICULAR FRACTURE OF DISTAL END OF RIGHT RADIUS, INITIAL ENCOUNTER: ICD-10-CM

## 2025-03-05 DIAGNOSIS — S52.551A OTHER CLOSED EXTRA-ARTICULAR FRACTURE OF DISTAL END OF RIGHT RADIUS, INITIAL ENCOUNTER: Primary | ICD-10-CM

## 2025-03-05 PROCEDURE — 73110 X-RAY EXAM OF WRIST: CPT | Performed by: PHYSICIAN ASSISTANT

## 2025-03-05 PROCEDURE — 1159F MED LIST DOCD IN RCRD: CPT | Performed by: PHYSICIAN ASSISTANT

## 2025-03-05 PROCEDURE — 1160F RVW MEDS BY RX/DR IN RCRD: CPT | Performed by: PHYSICIAN ASSISTANT

## 2025-03-05 PROCEDURE — 99213 OFFICE O/P EST LOW 20 MIN: CPT | Performed by: PHYSICIAN ASSISTANT

## 2025-03-05 PROCEDURE — 1125F AMNT PAIN NOTED PAIN PRSNT: CPT | Performed by: PHYSICIAN ASSISTANT

## 2025-03-05 NOTE — PROGRESS NOTES
Clinic Note [facility]     Assessment/Plan:  84 year old male    Right wrist nondisplaced distal radius fracture-healing well.  Patient will continue wearing the brace for 2 more weeks and then can wean out of the splint.  And happy with his recovery thus far.  He has maintained good motion I do not think he will need formal therapy.  He has any questions or concerns he will return in 4 weeks.  Left ring finger trigger finger-doing well postinjection      Injection: Written consent was obtained.  Skin was prepped with alcohol.  1 mL of 6 mg of betamethasone and 1 mL of 1% lidocaine was injected into the left ring finger a1 pulley.  Patient tolerated the procedure well.  No complications were encountered.  Band-Aid was applied.       Follow Up: 4 weeks with x-rays but can cancel if better    Diagnostic Studies:  X-rays right wrist 3 views do it these revealed evidence of a nondisplaced distal radius fracture    Physical Exam:    There were no vitals taken for this visit.    Constitutional: NAD. AOx3. Well-developed and Well-nourished.   Psychiatric: Normal mood/ affect/ behavior. Judgment and thought content normal.     Right upper Extremity:   Inspection: Skin Intact. No skin lesions. No gross deformity.   Palpation: Tender palpation along Tara's tubercle nontender over the rest of the hand     Motion: Elbow: normal bilateral symmetric ext/flex  Wrist: 40/40  Finger: full composite fist       CC: Follow - Up (RT WRIST )        HPI: This 84 year old male presents with complaints of pain to his right wrist.  He tripped and fell on 2/6/2025.  He had pain and swelling went to urgent care and they told him to follow-up orthopedics.  He continues to have some pain but is overall improving.  He also describes left ring finger triggering and locking where will catch in the flexed position and he has to forcibly extend it    Interval history: Patient states his pain has slightly improved and has been wearing the  brace full-time.    Past Medical History:    Cancer (HCC)    DVT (deep venous thrombosis) (HCC)    Esophageal reflux    OTHER DISEASES    acid reflux    OTHER DISEASES    hx of blood clots x 2 in lung and 1 in leg    Pulmonary embolism (HCC)    Reflux       Past Surgical History:   Procedure Laterality Date    Hip replacement surgery Bilateral     Patient documented not to have experienced any of the following events  1/8/2014    Procedure: RIGHT PHACOEMULSIFICATION OF CATARACT WITH INTRAOCULAR LENS IMPLANT [id number];  Surgeon: Prabhjot Sam MD;  Location: [facility]    Patient withough preoperative order for iv antibiotic surgical site infection prophylaxis.  1/8/2014    Procedure: RIGHT PHACOEMULSIFICATION OF CATARACT WITH INTRAOCULAR LENS IMPLANT [id number];  Surgeon: Prabhjot Sam MD;  Location: [facility]    Remv cataract extracap,insert lens  1/8/2014    Procedure: RIGHT PHACOEMULSIFICATION OF CATARACT WITH INTRAOCULAR LENS IMPLANT [id number];  Surgeon: Prabhjot Sam MD;  Location: [facility]       Current Outpatient Medications   Medication Sig Dispense Refill    atorvastatin 20 MG Oral Tab Take 1 tablet (20 mg total) by mouth daily.      midodrine 5 MG Oral Tab Take 1 tablet (5 mg total) by mouth.      rivaroxaban (XARELTO) 2.5 MG Oral Tab Take 1 tablet (2.5 mg total) by mouth 2 (two) times daily.      metoprolol succinate ER 25 MG Oral Tablet 24 Hr Take 1 tablet (25 mg total) by mouth daily.      Omeprazole Magnesium 10 MG Oral Powd Pack Take  by mouth.      apixaban 2.5 MG Oral Tab Take 1 tablet (2.5 mg total) by mouth.      Omeprazole 40 MG Oral Capsule Delayed Release       aspirin 81 MG Oral Tab EC Take 81 mg by mouth daily. (Patient not taking: Reported on 3/5/2025)         Allergies[1]    No family history on file.    Social History     Occupational History    Not on file   Tobacco Use    Smoking status: Former    Smokeless tobacco: Never   Vaping Use    Vaping  status: Never Used   Substance and Sexual Activity    Alcohol use: No    Drug use: No    Sexual activity: Not on file        Review of Systems (negative unless bolded):  General: fevers, chills, fatigue  CV:  chest pain, palpitations, leg swelling  Msk: bodyaches, neck pain, neck stiffness  Skin: rashes, open wounds, nonhealing ulcers  Hem: bleeds easily, bruise easily, immunocompromised  Neuro: dizziness, light headedness, headaches  Psych: anxious, depressed, anger issues  Maribel Gibson PA-C  Hand, Wrist, & Elbow Surgery  Physician Assistant to Dr. [email or web address] [email or web address]  t: [phone]  f: [phone]         [1] No Known Allergies